# Patient Record
Sex: FEMALE | Race: ASIAN | NOT HISPANIC OR LATINO
[De-identification: names, ages, dates, MRNs, and addresses within clinical notes are randomized per-mention and may not be internally consistent; named-entity substitution may affect disease eponyms.]

---

## 2021-03-26 ENCOUNTER — APPOINTMENT (OUTPATIENT)
Dept: VASCULAR SURGERY | Facility: CLINIC | Age: 61
End: 2021-03-26

## 2021-05-21 ENCOUNTER — APPOINTMENT (OUTPATIENT)
Dept: UROLOGY | Facility: CLINIC | Age: 61
End: 2021-05-21

## 2021-05-27 ENCOUNTER — APPOINTMENT (OUTPATIENT)
Dept: UROLOGY | Facility: CLINIC | Age: 61
End: 2021-05-27

## 2022-02-11 ENCOUNTER — INPATIENT (INPATIENT)
Facility: HOSPITAL | Age: 62
LOS: 0 days | Discharge: HOME | End: 2022-02-12
Attending: INTERNAL MEDICINE | Admitting: INTERNAL MEDICINE
Payer: COMMERCIAL

## 2022-02-11 VITALS
OXYGEN SATURATION: 100 % | HEIGHT: 60 IN | WEIGHT: 141.98 LBS | RESPIRATION RATE: 17 BRPM | DIASTOLIC BLOOD PRESSURE: 74 MMHG | HEART RATE: 68 BPM | TEMPERATURE: 98 F | SYSTOLIC BLOOD PRESSURE: 118 MMHG

## 2022-02-11 DIAGNOSIS — E11.8 TYPE 2 DIABETES MELLITUS WITH UNSPECIFIED COMPLICATIONS: ICD-10-CM

## 2022-02-11 DIAGNOSIS — R07.89 OTHER CHEST PAIN: ICD-10-CM

## 2022-02-11 DIAGNOSIS — I10 ESSENTIAL (PRIMARY) HYPERTENSION: ICD-10-CM

## 2022-02-11 DIAGNOSIS — I73.9 PERIPHERAL VASCULAR DISEASE, UNSPECIFIED: ICD-10-CM

## 2022-02-11 DIAGNOSIS — E78.5 HYPERLIPIDEMIA, UNSPECIFIED: ICD-10-CM

## 2022-02-11 DIAGNOSIS — R06.02 SHORTNESS OF BREATH: ICD-10-CM

## 2022-02-11 DIAGNOSIS — M25.511 PAIN IN RIGHT SHOULDER: ICD-10-CM

## 2022-02-11 DIAGNOSIS — Z95.1 PRESENCE OF AORTOCORONARY BYPASS GRAFT: ICD-10-CM

## 2022-02-11 LAB
ALBUMIN SERPL ELPH-MCNC: 3.6 G/DL — SIGNIFICANT CHANGE UP (ref 3.3–5)
ALP SERPL-CCNC: 69 U/L — SIGNIFICANT CHANGE UP (ref 40–120)
ALT FLD-CCNC: 30 U/L — SIGNIFICANT CHANGE UP (ref 12–78)
ANION GAP SERPL CALC-SCNC: 7 MMOL/L — SIGNIFICANT CHANGE UP (ref 5–17)
APTT BLD: 26.9 SEC — LOW (ref 27.5–35.5)
AST SERPL-CCNC: 25 U/L — SIGNIFICANT CHANGE UP (ref 15–37)
BASOPHILS # BLD AUTO: 0.06 K/UL — SIGNIFICANT CHANGE UP (ref 0–0.2)
BASOPHILS NFR BLD AUTO: 0.8 % — SIGNIFICANT CHANGE UP (ref 0–2)
BILIRUB SERPL-MCNC: 0.4 MG/DL — SIGNIFICANT CHANGE UP (ref 0.2–1.2)
BUN SERPL-MCNC: 33 MG/DL — HIGH (ref 7–23)
CALCIUM SERPL-MCNC: 9.7 MG/DL — SIGNIFICANT CHANGE UP (ref 8.5–10.1)
CHLORIDE SERPL-SCNC: 104 MMOL/L — SIGNIFICANT CHANGE UP (ref 96–108)
CK MB BLD-MCNC: 0.4 % — SIGNIFICANT CHANGE UP (ref 0–3.5)
CK MB CFR SERPL CALC: 1.2 NG/ML — SIGNIFICANT CHANGE UP (ref 0.5–3.6)
CK SERPL-CCNC: 291 U/L — HIGH (ref 26–192)
CO2 SERPL-SCNC: 27 MMOL/L — SIGNIFICANT CHANGE UP (ref 22–31)
CREAT SERPL-MCNC: 1.52 MG/DL — HIGH (ref 0.5–1.3)
D DIMER BLD IA.RAPID-MCNC: <150 NG/ML DDU — SIGNIFICANT CHANGE UP
EOSINOPHIL # BLD AUTO: 0.21 K/UL — SIGNIFICANT CHANGE UP (ref 0–0.5)
EOSINOPHIL NFR BLD AUTO: 2.8 % — SIGNIFICANT CHANGE UP (ref 0–6)
FLUAV AG NPH QL: SIGNIFICANT CHANGE UP
FLUBV AG NPH QL: SIGNIFICANT CHANGE UP
GLUCOSE BLDC GLUCOMTR-MCNC: 126 MG/DL — HIGH (ref 70–99)
GLUCOSE BLDC GLUCOMTR-MCNC: 185 MG/DL — HIGH (ref 70–99)
GLUCOSE SERPL-MCNC: 234 MG/DL — HIGH (ref 70–99)
HCT VFR BLD CALC: 39 % — SIGNIFICANT CHANGE UP (ref 34.5–45)
HGB BLD-MCNC: 13.1 G/DL — SIGNIFICANT CHANGE UP (ref 11.5–15.5)
IMM GRANULOCYTES NFR BLD AUTO: 0.3 % — SIGNIFICANT CHANGE UP (ref 0–1.5)
INR BLD: 0.96 RATIO — SIGNIFICANT CHANGE UP (ref 0.88–1.16)
LYMPHOCYTES # BLD AUTO: 2.6 K/UL — SIGNIFICANT CHANGE UP (ref 1–3.3)
LYMPHOCYTES # BLD AUTO: 34.2 % — SIGNIFICANT CHANGE UP (ref 13–44)
MCHC RBC-ENTMCNC: 29.4 PG — SIGNIFICANT CHANGE UP (ref 27–34)
MCHC RBC-ENTMCNC: 33.6 G/DL — SIGNIFICANT CHANGE UP (ref 32–36)
MCV RBC AUTO: 87.6 FL — SIGNIFICANT CHANGE UP (ref 80–100)
MONOCYTES # BLD AUTO: 0.91 K/UL — HIGH (ref 0–0.9)
MONOCYTES NFR BLD AUTO: 12 % — SIGNIFICANT CHANGE UP (ref 2–14)
NEUTROPHILS # BLD AUTO: 3.81 K/UL — SIGNIFICANT CHANGE UP (ref 1.8–7.4)
NEUTROPHILS NFR BLD AUTO: 49.9 % — SIGNIFICANT CHANGE UP (ref 43–77)
NRBC # BLD: 0 /100 WBCS — SIGNIFICANT CHANGE UP (ref 0–0)
NT-PROBNP SERPL-SCNC: 60 PG/ML — SIGNIFICANT CHANGE UP (ref 0–125)
PLATELET # BLD AUTO: 287 K/UL — SIGNIFICANT CHANGE UP (ref 150–400)
POTASSIUM SERPL-MCNC: 4.4 MMOL/L — SIGNIFICANT CHANGE UP (ref 3.5–5.3)
POTASSIUM SERPL-SCNC: 4.4 MMOL/L — SIGNIFICANT CHANGE UP (ref 3.5–5.3)
PROT SERPL-MCNC: 8 GM/DL — SIGNIFICANT CHANGE UP (ref 6–8.3)
PROTHROM AB SERPL-ACNC: 11.2 SEC — SIGNIFICANT CHANGE UP (ref 10.6–13.6)
RBC # BLD: 4.45 M/UL — SIGNIFICANT CHANGE UP (ref 3.8–5.2)
RBC # FLD: 13.2 % — SIGNIFICANT CHANGE UP (ref 10.3–14.5)
SARS-COV-2 RNA SPEC QL NAA+PROBE: SIGNIFICANT CHANGE UP
SODIUM SERPL-SCNC: 138 MMOL/L — SIGNIFICANT CHANGE UP (ref 135–145)
TROPONIN I, HIGH SENSITIVITY RESULT: 11.7 NG/L — SIGNIFICANT CHANGE UP
TROPONIN I, HIGH SENSITIVITY RESULT: 12.1 NG/L — SIGNIFICANT CHANGE UP
WBC # BLD: 7.61 K/UL — SIGNIFICANT CHANGE UP (ref 3.8–10.5)
WBC # FLD AUTO: 7.61 K/UL — SIGNIFICANT CHANGE UP (ref 3.8–10.5)

## 2022-02-11 PROCEDURE — 73030 X-RAY EXAM OF SHOULDER: CPT | Mod: 26,RT

## 2022-02-11 PROCEDURE — 93010 ELECTROCARDIOGRAM REPORT: CPT

## 2022-02-11 PROCEDURE — 93306 TTE W/DOPPLER COMPLETE: CPT | Mod: 26

## 2022-02-11 PROCEDURE — 99223 1ST HOSP IP/OBS HIGH 75: CPT

## 2022-02-11 PROCEDURE — 71045 X-RAY EXAM CHEST 1 VIEW: CPT | Mod: 26

## 2022-02-11 PROCEDURE — 99285 EMERGENCY DEPT VISIT HI MDM: CPT

## 2022-02-11 RX ORDER — NITROGLYCERIN 6.5 MG
0.4 CAPSULE, EXTENDED RELEASE ORAL
Refills: 0 | Status: DISCONTINUED | OUTPATIENT
Start: 2022-02-11 | End: 2022-02-12

## 2022-02-11 RX ORDER — DEXTROSE 50 % IN WATER 50 %
12.5 SYRINGE (ML) INTRAVENOUS ONCE
Refills: 0 | Status: DISCONTINUED | OUTPATIENT
Start: 2022-02-11 | End: 2022-02-12

## 2022-02-11 RX ORDER — ACETAMINOPHEN 500 MG
650 TABLET ORAL EVERY 6 HOURS
Refills: 0 | Status: DISCONTINUED | OUTPATIENT
Start: 2022-02-11 | End: 2022-02-12

## 2022-02-11 RX ORDER — ATORVASTATIN CALCIUM 80 MG/1
40 TABLET, FILM COATED ORAL AT BEDTIME
Refills: 0 | Status: DISCONTINUED | OUTPATIENT
Start: 2022-02-11 | End: 2022-02-12

## 2022-02-11 RX ORDER — SODIUM CHLORIDE 9 MG/ML
1000 INJECTION, SOLUTION INTRAVENOUS
Refills: 0 | Status: DISCONTINUED | OUTPATIENT
Start: 2022-02-11 | End: 2022-02-12

## 2022-02-11 RX ORDER — HEPARIN SODIUM 5000 [USP'U]/ML
INJECTION INTRAVENOUS; SUBCUTANEOUS
Qty: 25000 | Refills: 0 | Status: DISCONTINUED | OUTPATIENT
Start: 2022-02-11 | End: 2022-02-11

## 2022-02-11 RX ORDER — INSULIN LISPRO 100/ML
VIAL (ML) SUBCUTANEOUS AT BEDTIME
Refills: 0 | Status: DISCONTINUED | OUTPATIENT
Start: 2022-02-11 | End: 2022-02-12

## 2022-02-11 RX ORDER — ASPIRIN/CALCIUM CARB/MAGNESIUM 324 MG
81 TABLET ORAL DAILY
Refills: 0 | Status: DISCONTINUED | OUTPATIENT
Start: 2022-02-12 | End: 2022-02-12

## 2022-02-11 RX ORDER — DEXTROSE 50 % IN WATER 50 %
25 SYRINGE (ML) INTRAVENOUS ONCE
Refills: 0 | Status: DISCONTINUED | OUTPATIENT
Start: 2022-02-11 | End: 2022-02-12

## 2022-02-11 RX ORDER — INSULIN LISPRO 100/ML
VIAL (ML) SUBCUTANEOUS
Refills: 0 | Status: DISCONTINUED | OUTPATIENT
Start: 2022-02-11 | End: 2022-02-12

## 2022-02-11 RX ORDER — HYDROCHLOROTHIAZIDE 25 MG
25 TABLET ORAL DAILY
Refills: 0 | Status: DISCONTINUED | OUTPATIENT
Start: 2022-02-11 | End: 2022-02-12

## 2022-02-11 RX ORDER — CLOPIDOGREL BISULFATE 75 MG/1
75 TABLET, FILM COATED ORAL DAILY
Refills: 0 | Status: DISCONTINUED | OUTPATIENT
Start: 2022-02-12 | End: 2022-02-12

## 2022-02-11 RX ORDER — LANOLIN ALCOHOL/MO/W.PET/CERES
3 CREAM (GRAM) TOPICAL AT BEDTIME
Refills: 0 | Status: DISCONTINUED | OUTPATIENT
Start: 2022-02-11 | End: 2022-02-12

## 2022-02-11 RX ORDER — DEXTROSE 50 % IN WATER 50 %
15 SYRINGE (ML) INTRAVENOUS ONCE
Refills: 0 | Status: DISCONTINUED | OUTPATIENT
Start: 2022-02-11 | End: 2022-02-12

## 2022-02-11 RX ORDER — HEPARIN SODIUM 5000 [USP'U]/ML
3800 INJECTION INTRAVENOUS; SUBCUTANEOUS ONCE
Refills: 0 | Status: DISCONTINUED | OUTPATIENT
Start: 2022-02-11 | End: 2022-02-11

## 2022-02-11 RX ORDER — SODIUM CHLORIDE 9 MG/ML
1000 INJECTION INTRAMUSCULAR; INTRAVENOUS; SUBCUTANEOUS ONCE
Refills: 0 | Status: COMPLETED | OUTPATIENT
Start: 2022-02-11 | End: 2022-02-11

## 2022-02-11 RX ORDER — METOPROLOL TARTRATE 50 MG
200 TABLET ORAL DAILY
Refills: 0 | Status: DISCONTINUED | OUTPATIENT
Start: 2022-02-11 | End: 2022-02-12

## 2022-02-11 RX ORDER — LOSARTAN POTASSIUM 100 MG/1
100 TABLET, FILM COATED ORAL DAILY
Refills: 0 | Status: DISCONTINUED | OUTPATIENT
Start: 2022-02-11 | End: 2022-02-12

## 2022-02-11 RX ORDER — GLUCAGON INJECTION, SOLUTION 0.5 MG/.1ML
1 INJECTION, SOLUTION SUBCUTANEOUS ONCE
Refills: 0 | Status: DISCONTINUED | OUTPATIENT
Start: 2022-02-11 | End: 2022-02-12

## 2022-02-11 RX ORDER — HEPARIN SODIUM 5000 [USP'U]/ML
3800 INJECTION INTRAVENOUS; SUBCUTANEOUS EVERY 6 HOURS
Refills: 0 | Status: DISCONTINUED | OUTPATIENT
Start: 2022-02-11 | End: 2022-02-11

## 2022-02-11 RX ADMIN — SODIUM CHLORIDE 1000 MILLILITER(S): 9 INJECTION INTRAMUSCULAR; INTRAVENOUS; SUBCUTANEOUS at 14:06

## 2022-02-11 RX ADMIN — Medication 3 MILLIGRAM(S): at 21:53

## 2022-02-11 RX ADMIN — ATORVASTATIN CALCIUM 40 MILLIGRAM(S): 80 TABLET, FILM COATED ORAL at 21:53

## 2022-02-11 NOTE — ED ADULT NURSE NOTE - SUICIDE SCREENING QUESTION 3
No
no pain R/no diplopia/no photophobia/no discharge R/no pain L/no lacrimation L/no lacrimation R/no discharge L/no irritation L/no irritation R

## 2022-02-11 NOTE — ED ADULT NURSE NOTE - CHIEF COMPLAINT QUOTE
pt a&o x4 ambulatory c.o of chest pain x2 days, seen at hospital last night discharged with pantoprazole. Pt states pain worsening. HX open heart surgery.

## 2022-02-11 NOTE — ED PROVIDER NOTE - CLINICAL SUMMARY MEDICAL DECISION MAKING FREE TEXT BOX
62yo female with pmh of DM, HTN, HLD, CABG, DVT presents complaining of "not feeling well" for a week or so. States she has been feeling intermittently lightheaded with a tight chest pain at the center of her chest associated with sob. Well appearing, vitals stable. Exam benign. Given high risk hx will get labs, CE, CXR, EKG. Consider admission for cardiac work up given HEART score of 6 before troponin.

## 2022-02-11 NOTE — ED ADULT NURSE NOTE - NSICDXPASTMEDICALHX_GEN_ALL_CORE_FT
PAST MEDICAL HISTORY:  CABG (coronary artery bypass graft)     CAD (coronary artery disease)     Diabetes mellitus type II     Dyslipidemia     HTN

## 2022-02-11 NOTE — H&P ADULT - HISTORY OF PRESENT ILLNESS
61 years old female with h/o HTN, HLD, DM, CAD s/p CABG in 2009, PCI with 3 stents in 2015, PAD s/p angioplasty in 04/2021 present to ED with complain SOB with exertion and intermittent chest discomfort. Patient reported SOB for last 2 months, more with exertion. No orthopnea. She also reported intermittent mid chest discomfort, 6-7/10 ( biting pain). Patient reported lightheadedness lastnight with near syncope, went to [facility] but left AMA. She also reported intermittent claudication as well. Reported right shoulder pain for last 3 months.  Hemodynamically stable, afebrile, sat well at . EKG with NSR, VR 66, QTc 406, TWI in I, II, V 4,5,6. No leukocytosis, plt 287, ddimer negative, Cr 1.52, K 4.4, hsTnT 12.1. CXR image reviewed, no focal consolidation.     SH: no toxic habits  FH: mother-HTN

## 2022-02-11 NOTE — H&P ADULT - ASSESSMENT
61 years old female with h/o HTN, HLD, DM, CAD s/p CABG in 2009, PCI with 3 stents in 2015, PAD s/p angioplasty in 04/2021 present to ED with complain SOB with exertion and intermittent chest discomfort  Hemodynamically stable, afebrile, sat well at [facility]. EKG with NSR, VR 66, QTc 406, TWI in I, II, V 4,5,6. No leukocytosis, plt 287, ddimer negative, Cr 1.52, K 4.4, hsTnT 12.1. CXR image reviewed, no focal consolidation.     Admitted with chest pain, SOB

## 2022-02-11 NOTE — H&P ADULT - NSHPREVIEWOFSYSTEMS_GEN_ALL_CORE
All ROS were negative except SOB on exertion, intermittent chest pain, intermittent claudication, right shoulder pain

## 2022-02-11 NOTE — H&P ADULT - PROBLEM SELECTOR PLAN 1
present with intermittent chest discomfort, no clear relation to physical activity  h/o CAD s/p CABG in 2009, PCI with 3 stents in 2015  EKG with NSR, VR 66, QTc 406, TWI in I, II, V 4,5,6  hsTnT 12.1  High HEART score  Serial troponin/CK/CKMB  on aspirin, plavix, statin, BB  ECHO, telemetry monitoring  ED consulted cardiology  nitroglycerin prn

## 2022-02-11 NOTE — ED PROVIDER NOTE - ATTENDING CONTRIBUTION TO CARE
I performed a history and physical exam of the patient and discussed their management with the resident and /or advanced care provider. I reviewed the resident and /or ACP's note and agree with the documented findings and plan of care. My medical decision making and observations are found above.    60 yo F hx HTN, HLD, DM2, CAD s/p CABG presenting with intermittent exertional chest pain and sob x 2 days, EKG with inversion in lateral leads, AMAd from [facility] ED after rec admission, HEART score 6, tba.

## 2022-02-11 NOTE — ED PROVIDER NOTE - OBJECTIVE STATEMENT
60yo female with pmh of DM, HTN, HLD, CABG, DVT presents complaining of "not feeling well" for a week or so. States she has been feeling intermittently lightheaded with a tight chest pain at the center of her chest associated with sob. States she can get it anytime but does get worse with walking up stairs. States she had a syncopal episode yesterday for which she went to [facility]. Left ama because she didn't want to stay over night. Denies headache, cough, fevers/chills, LE swelling, palpitations and other associated sx. Reports that her BP and blood sugar have been fluctuating and higher than normal. 62yo female with pmh of DM, HTN, HLD, CABG, DVT presents complaining of "not feeling well" for a week or so. States she has been feeling intermittently lightheaded with a tight chest pain at the center of her chest associated with sob. States she can get it anytime but does get worse with walking up stairs. States she had a syncopal episode yesterday for which she went to [facility]. Left ama because she didn't want to stay over night. Denies headache, cough, fevers/chills, LE swelling, palpitations and other associated sx. Reports that her BP and blood sugar have been fluctuating and higher than normal.

## 2022-02-11 NOTE — H&P ADULT - PROBLEM SELECTOR PLAN 3
PAD s/p angioplasty in 04/2021, reported intermittent claudication  on aspirin, plavix, statin  RUPALI

## 2022-02-11 NOTE — H&P ADULT - PROBLEM SELECTOR PLAN 8
right shoulder pain for 3 months  ROM intact, no focal tenderness  No fall or trauma  Xray shoulder  tylenol prn

## 2022-02-11 NOTE — H&P ADULT - NSHPPHYSICALEXAM_GEN_ALL_CORE
CONSTITUTIONAL: Well developed, well nourished, alert and cooperative, no acute distress.  EYES: PERRL, no scleral icterus  ENT: Mucosa moist, tongue normal.   NECK: Neck supple, trachea midline, non-tender  CARDIAC: Normal S1 and S2. Regular rate and rhythms. No murmurs. No Pedal edema. Peripheral pulses intact  LUNGS: Clear to auscultation, equal air entry both lungs. No rales, rhonchi, wheezing. Normal respiratory effort.   ABDOMEN: Soft, nondistended, nontender. No guarding or rebound tenderness. No hepatomegaly or splenomegaly. Bowel sound normal.   MUSCULOSKELETAL: Normocephalic, atraumatic. No clubbing or cyanosis. No significant deformity or joint abnormality  NEUROLOGICAL: No gross motor or sensory deficits.  SKIN: no lesions or eruptions. Normal turgor  PSYCHIATRIC: A&O x 3, appropriate mood and affect

## 2022-02-11 NOTE — CONSULT NOTE ADULT - SUBJECTIVE AND OBJECTIVE BOX
CHIEF COMPLAINT:  Patient is a 61y old  Female who presents with a chief complaint of chest pain, SOB on exertion (11 Feb 2022 15:09)      HPI:  She is a pleasant 61-year-old with dyslipidemia, hypertension, type 2 diabetes, known CAD and previous CABG in 2009, prior PCI's in 2015, PAD, history of PTCA in 4/21, admitted with shortness of breath and chest discomfort intermittently for the last several months.  She has midsternal chest discomfort but also reproducible chest wall symptom.  Has had right shoulder pain for the last several months as well.      ALLERGIES:  No Known Allergies    Home Medications:  Lopressor:  (11 Feb 2022 12:35)  losartan:  (11 Feb 2022 12:35)  metFORMIN:  (11 Feb 2022 12:35)  Plavix 300 mg oral tablet:  (11 Feb 2022 12:35)  simvastatin:  (11 Feb 2022 12:35)    PAST MEDICAL & SURGICAL HISTORY:  CAD (coronary artery disease)    CABG (coronary artery bypass graft)    Diabetes mellitus type II    Dyslipidemia    HTN    S/P CABG x 3          FAMILY HISTORY:  FH: HTN (hypertension) (Mother)        SOCIAL HISTORY:  denied tobacco    REVIEW OF SYSTEMS:  General:  No wt loss, fevers, chills, night sweats  Eyes:  Good vision, no reported pain  ENT:  No sore throat, pain, runny nose, dysphagia  CV:  No pain, palpitations, hypo/hypertension  Resp:  No dyspnea, cough, tachypnea, wheezing  GI:  No pain, nausea, vomiting, diarrhea, constipation  :  No pain, bleeding, incontinence, nocturia  Muscle:  No pain, weakness  Breast:  No pain, abscess, mass, discharge  Neuro:  No weakness, tingling, memory problems  Psych:  No fatigue, insomnia, mood problems, depression  Endocrine:  No polyuria, polydipsia, cold/heat intolerance  Heme:  No petechiae, ecchymosis, easy bruisability  Skin:  No rash, edema    PHYSICAL EXAM:  Vital Signs:  Vital Signs Last 24 Hrs  T(C): 36.9 (11 Feb 2022 18:01), Max: 36.9 (11 Feb 2022 18:01)  T(F): 98.5 (11 Feb 2022 18:01), Max: 98.5 (11 Feb 2022 18:01)  HR: 63 (11 Feb 2022 18:01) (60 - 68)  BP: 147/69 (11 Feb 2022 18:01) (113/63 - 147/69)  RR: 18 (11 Feb 2022 18:01) (17 - 20)  SpO2: 96% (11 Feb 2022 18:01) (96% - 100%)    Tele: SR    Constitutional: well developed, normal appearance, well groomed, well nourished, no deformities and no acute distress.   Eyes: the conjunctiva exhibited no abnormalities and the eyelids demonstrated no xanthelasmas.   HEENT: normal oral mucosa, no oral pallor and no oral cyanosis.   Neck: normal jugular venous A waves present, normal jugular venous V waves present and no jugular venous epstein A waves.   Pulmonary: no respiratory distress, normal respiratory rhythm and effort, no accessory muscle use and lungs were clear to auscultation bilaterally.   Cardiovascular: heart rate and rhythm were normal, normal S1 and S2 and no murmur, gallop, rub, heave or thrill are present.   Abdomen: soft, non-tender   Musculoskeletal: the gait could not be assessed.   Extremities: no clubbing of the fingernails, no localized cyanosis, no petechial hemorrhages and no ischemic changes.   Skin: normal skin color and pigmentation, no rash, no venous stasis, no skin lesions, no skin ulcer and no xanthoma was observed.   Psychiatric: oriented to person, place, and time, the affect was normal, the mood was normal and not feeling anxious.      LABORATORY:                          13.1   7.61  )-----------( 287      ( 11 Feb 2022 12:47 )             39.0     02-11    138  |  104  |  33<H>  ----------------------------<  234<H>  4.4   |  27  |  1.52<H>    Ca    9.7      11 Feb 2022 12:47    TPro  8.0  /  Alb  3.6  /  TBili  0.4  /  DBili  x   /  AST  25  /  ALT  30  /  AlkPhos  69  02-11      CARDIAC MARKERS ( 11 Feb 2022 16:32 )  x     / x     / 291 U/L / x     / 1.2 ng/mL      CAPILLARY BLOOD GLUCOSE  POCT Blood Glucose.: 126 mg/dL (11 Feb 2022 18:16)    LIVER FUNCTIONS - ( 11 Feb 2022 12:47 )  Alb: 3.6 g/dL / Pro: 8.0 gm/dL / ALK PHOS: 69 U/L / ALT: 30 U/L / AST: 25 U/L / GGT: x           PT/INR - ( 11 Feb 2022 12:47 )   PT: 11.2 sec;   INR: 0.96 ratio         PTT - ( 11 Feb 2022 12:47 )  PTT:26.9 sec    BNPSerum Pro-Brain Natriuretic Peptide: 60 pg/mL (02-11-22 @ 16:32)      IMAGING:  < from: 12 Lead ECG (02.11.22 @ 11:21) >  Normal sinus rhythm  Minimal voltage criteria for LVH, may be normal variant  Nonspecific ST and T wave abnormality  Abnormal ECG  No previous ECGs available    < end of copied text >    < from: Xray Chest 1 View- PORTABLE-Urgent (02.11.22 @ 14:30) >  IMPRESSION: No acute finding or change.    < end of copied text >    ASSESSMENT:  She is a pleasant 61-year-old with dyslipidemia, hypertension, type 2 diabetes, known CAD and previous CABG in 2009, prior PCI's in 2015, PAD, history of PTCA in 4/21, admitted with shortness of breath and chest discomfort intermittently for the last several months.  She has midsternal chest discomfort but also reproducible chest wall symptom.  Has had right shoulder pain for the last several months as well.    PLAN:     Continue aspirin and statin for cardiac risk reduction and lipid-lowering.  Continue antihypertensives losartan, metoprolol and hydrochlorothiazide.  Stay on insulin for glycemic reduction in the setting of type 2 diabetes.  Follow troponin trends.  No need for IV heparin as there is no clear evidence of ACS at present.  Echo ordered to assess LV function and valvular status.  It sounds she has had a prior stress test perhaps about 6 months earlier and her outside cardiology office.  Further decisions to be determined based on her clinical status, telemetry monitoring and troponin trending.       Ammon Gauthier MD, FACC, TERESO, ANNIE, FACP  Director, Heart Failure Services  [facility]  , Department of Cardiology  [facility]     CHIEF COMPLAINT:  Patient is a 61y old  Female who presents with a chief complaint of chest pain, SOB on exertion (11 Feb 2022 15:09)      HPI:  She is a pleasant 61-year-old with dyslipidemia, hypertension, type 2 diabetes, known CAD and previous CABG in 2009, prior PCI's in 2015, PAD, history of PTCA in 4/21, admitted with shortness of breath and chest discomfort intermittently for the last several months.  She has midsternal chest discomfort but also reproducible chest wall symptom.  Has had right shoulder pain for the last several months as well.      ALLERGIES:  No Known Allergies    Home Medications:  Lopressor:  (11 Feb 2022 12:35)  losartan:  (11 Feb 2022 12:35)  metFORMIN:  (11 Feb 2022 12:35)  Plavix 300 mg oral tablet:  (11 Feb 2022 12:35)  simvastatin:  (11 Feb 2022 12:35)    PAST MEDICAL & SURGICAL HISTORY:  CAD (coronary artery disease)    CABG (coronary artery bypass graft)    Diabetes mellitus type II    Dyslipidemia    HTN    S/P CABG x 3          FAMILY HISTORY:  FH: HTN (hypertension) (Mother)        SOCIAL HISTORY:  denied tobacco    REVIEW OF SYSTEMS:  General:  No wt loss, fevers, chills, night sweats  Eyes:  Good vision, no reported pain  ENT:  No sore throat, pain, runny nose, dysphagia  CV:  No pain, palpitations, hypo/hypertension  Resp:  No dyspnea, cough, tachypnea, wheezing  GI:  No pain, nausea, vomiting, diarrhea, constipation  :  No pain, bleeding, incontinence, nocturia  Muscle:  No pain, weakness  Breast:  No pain, abscess, mass, discharge  Neuro:  No weakness, tingling, memory problems  Psych:  No fatigue, insomnia, mood problems, depression  Endocrine:  No polyuria, polydipsia, cold/heat intolerance  Heme:  No petechiae, ecchymosis, easy bruisability  Skin:  No rash, edema    PHYSICAL EXAM:  Vital Signs:  Vital Signs Last 24 Hrs  T(C): 36.9 (11 Feb 2022 18:01), Max: 36.9 (11 Feb 2022 18:01)  T(F): 98.5 (11 Feb 2022 18:01), Max: 98.5 (11 Feb 2022 18:01)  HR: 63 (11 Feb 2022 18:01) (60 - 68)  BP: 147/69 (11 Feb 2022 18:01) (113/63 - 147/69)  RR: 18 (11 Feb 2022 18:01) (17 - 20)  SpO2: 96% (11 Feb 2022 18:01) (96% - 100%)    Tele: SR    Constitutional: well developed, normal appearance, well groomed, well nourished, no deformities and no acute distress.   Eyes: the conjunctiva exhibited no abnormalities and the eyelids demonstrated no xanthelasmas.   HEENT: normal oral mucosa, no oral pallor and no oral cyanosis.   Neck: normal jugular venous A waves present, normal jugular venous V waves present and no jugular venous epstein A waves.   Pulmonary: no respiratory distress, normal respiratory rhythm and effort, no accessory muscle use and lungs were clear to auscultation bilaterally.   Cardiovascular: heart rate and rhythm were normal, normal S1 and S2 and no murmur, gallop, rub, heave or thrill are present.   Abdomen: soft, non-tender   Musculoskeletal: the gait could not be assessed.   Extremities: no clubbing of the fingernails, no localized cyanosis, no petechial hemorrhages and no ischemic changes.   Skin: normal skin color and pigmentation, no rash, no venous stasis, no skin lesions, no skin ulcer and no xanthoma was observed.   Psychiatric: oriented to person, place, and time, the affect was normal, the mood was normal and not feeling anxious.      LABORATORY:                          13.1   7.61  )-----------( 287      ( 11 Feb 2022 12:47 )             39.0     02-11    138  |  104  |  33<H>  ----------------------------<  234<H>  4.4   |  27  |  1.52<H>    Ca    9.7      11 Feb 2022 12:47    TPro  8.0  /  Alb  3.6  /  TBili  0.4  /  DBili  x   /  AST  25  /  ALT  30  /  AlkPhos  69  02-11      CARDIAC MARKERS ( 11 Feb 2022 16:32 )  x     / x     / 291 U/L / x     / 1.2 ng/mL      CAPILLARY BLOOD GLUCOSE  POCT Blood Glucose.: 126 mg/dL (11 Feb 2022 18:16)    LIVER FUNCTIONS - ( 11 Feb 2022 12:47 )  Alb: 3.6 g/dL / Pro: 8.0 gm/dL / ALK PHOS: 69 U/L / ALT: 30 U/L / AST: 25 U/L / GGT: x           PT/INR - ( 11 Feb 2022 12:47 )   PT: 11.2 sec;   INR: 0.96 ratio         PTT - ( 11 Feb 2022 12:47 )  PTT:26.9 sec    BNPSerum Pro-Brain Natriuretic Peptide: 60 pg/mL (02-11-22 @ 16:32)      IMAGING:  < from: 12 Lead ECG (02.11.22 @ 11:21) >  Normal sinus rhythm  Minimal voltage criteria for LVH, may be normal variant  Nonspecific ST and T wave abnormality  Abnormal ECG  No previous ECGs available    < end of copied text >    < from: Xray Chest 1 View- PORTABLE-Urgent (02.11.22 @ 14:30) >  IMPRESSION: No acute finding or change.    < end of copied text >    < from: TTE Echo Complete w/o Contrast w/ Doppler (02.11.22 @ 16:54) >  Summary:   1. Left ventricular ejection fraction, by visual estimation, is 60 to   65%.   2. Technically fair study.   3. Normal global left ventricular systolic function.   4. Mildly increased LV wall thickness.   5. Normal left ventricular internal cavity size.   6. Spectral Doppler showsimpaired relaxation pattern of left   ventricular myocardial filling (Grade I diastolic dysfunction).   7. Normal right ventricular size and function.   8. Normal left atrial size.   9. Normal right atrial size.  10. There is no evidence of pericardial effusion.  11. Mild tricuspid regurgitation.  12. Sclerotic aortic valve with normal opening.  13. Moderate pulmonic valve regurgitation.  14. Increased relative wall thickness with normal mass index consistent   with left ventricular concentric remodeling.    < end of copied text >    ASSESSMENT:  She is a pleasant 61-year-old with dyslipidemia, hypertension, type 2 diabetes, known CAD and previous CABG in 2009, prior PCI's in 2015, PAD, history of PTCA in 4/21, admitted with shortness of breath and chest discomfort intermittently for the last several months.  She has midsternal chest discomfort but also reproducible chest wall symptom.  Has had right shoulder pain for the last several months as well.    PLAN:     Continue aspirin and statin for cardiac risk reduction and lipid-lowering.  Continue antihypertensives losartan, metoprolol and hydrochlorothiazide.  Stay on insulin for glycemic reduction in the setting of type 2 diabetes.  Follow troponin trends.  No need for IV heparin as there is no clear evidence of ACS at present.  Echo ordered to assess LV function and valvular status.  It sounds she has had a prior stress test perhaps about 6 months earlier and her outside cardiology office.  Further decisions to be determined based on her clinical status, telemetry monitoring and troponin trending. Prelim echo within acceptable limits.       Ammon Gauthier MD, FACC, TERESO, ANNIE, FACP  Director, Heart Failure Services  [facility]  , Department of Cardiology  [facility]     CHIEF COMPLAINT:  Patient is a 61y old  Female who presents with a chief complaint of chest pain, SOB on exertion (11 Feb 2022 15:09)      HPI:  She is a pleasant 61-year-old with dyslipidemia, hypertension, type 2 diabetes, known CAD and previous CABG in 2009, prior PCI's in 2015, PAD, history of PTCA in 4/21, admitted with shortness of breath and chest discomfort intermittently for the last several months.  She has midsternal chest discomfort but also reproducible chest wall symptom.  Has had right shoulder pain for the last several months as well.      ALLERGIES:  No Known Allergies    Home Medications:  Lopressor:  (11 Feb 2022 12:35)  losartan:  (11 Feb 2022 12:35)  metFORMIN:  (11 Feb 2022 12:35)  Plavix 300 mg oral tablet:  (11 Feb 2022 12:35)  simvastatin:  (11 Feb 2022 12:35)    PAST MEDICAL & SURGICAL HISTORY:  CAD (coronary artery disease)    CABG (coronary artery bypass graft)    Diabetes mellitus type II    Dyslipidemia    HTN    S/P CABG x 3          FAMILY HISTORY:  FH: HTN (hypertension) (Mother)        SOCIAL HISTORY:  denied tobacco    REVIEW OF SYSTEMS:  General:  No wt loss, fevers, chills, night sweats  Eyes:  Good vision, no reported pain  ENT:  No sore throat, pain, runny nose, dysphagia  CV:  No pain, palpitations, hypo/hypertension  Resp:  No dyspnea, cough, tachypnea, wheezing  GI:  No pain, nausea, vomiting, diarrhea, constipation  :  No pain, bleeding, incontinence, nocturia  Muscle:  No pain, weakness  Breast:  No pain, abscess, mass, discharge  Neuro:  No weakness, tingling, memory problems  Psych:  No fatigue, insomnia, mood problems, depression  Endocrine:  No polyuria, polydipsia, cold/heat intolerance  Heme:  No petechiae, ecchymosis, easy bruisability  Skin:  No rash, edema    PHYSICAL EXAM:  Vital Signs:  Vital Signs Last 24 Hrs  T(C): 36.9 (11 Feb 2022 18:01), Max: 36.9 (11 Feb 2022 18:01)  T(F): 98.5 (11 Feb 2022 18:01), Max: 98.5 (11 Feb 2022 18:01)  HR: 63 (11 Feb 2022 18:01) (60 - 68)  BP: 147/69 (11 Feb 2022 18:01) (113/63 - 147/69)  RR: 18 (11 Feb 2022 18:01) (17 - 20)  SpO2: 96% (11 Feb 2022 18:01) (96% - 100%)    Tele: SR    Constitutional: well developed, normal appearance, well groomed, well nourished, no deformities and no acute distress.   Eyes: the conjunctiva exhibited no abnormalities and the eyelids demonstrated no xanthelasmas.   HEENT: normal oral mucosa, no oral pallor and no oral cyanosis.   Neck: normal jugular venous A waves present, normal jugular venous V waves present and no jugular venous epstein A waves.   Pulmonary: no respiratory distress, normal respiratory rhythm and effort, no accessory muscle use and lungs were clear to auscultation bilaterally.   Cardiovascular: heart rate and rhythm were normal, normal S1 and S2 and no murmur, gallop, rub, heave or thrill are present.   Abdomen: soft, non-tender   Musculoskeletal: the gait could not be assessed.   Extremities: no clubbing of the fingernails, no localized cyanosis, no petechial hemorrhages and no ischemic changes.   Skin: normal skin color and pigmentation, no rash, no venous stasis, no skin lesions, no skin ulcer and no xanthoma was observed.   Psychiatric: oriented to person, place, and time, the affect was normal, the mood was normal and not feeling anxious.      LABORATORY:                          13.1   7.61  )-----------( 287      ( 11 Feb 2022 12:47 )             39.0     02-11    138  |  104  |  33<H>  ----------------------------<  234<H>  4.4   |  27  |  1.52<H>    Ca    9.7      11 Feb 2022 12:47    TPro  8.0  /  Alb  3.6  /  TBili  0.4  /  DBili  x   /  AST  25  /  ALT  30  /  AlkPhos  69  02-11      CARDIAC MARKERS ( 11 Feb 2022 16:32 )  x     / x     / 291 U/L / x     / 1.2 ng/mL      CAPILLARY BLOOD GLUCOSE  POCT Blood Glucose.: 126 mg/dL (11 Feb 2022 18:16)    LIVER FUNCTIONS - ( 11 Feb 2022 12:47 )  Alb: 3.6 g/dL / Pro: 8.0 gm/dL / ALK PHOS: 69 U/L / ALT: 30 U/L / AST: 25 U/L / GGT: x           PT/INR - ( 11 Feb 2022 12:47 )   PT: 11.2 sec;   INR: 0.96 ratio         PTT - ( 11 Feb 2022 12:47 )  PTT:26.9 sec    BNPSerum Pro-Brain Natriuretic Peptide: 60 pg/mL (02-11-22 @ 16:32)      IMAGING:  < from: 12 Lead ECG (02.11.22 @ 11:21) >  Normal sinus rhythm  Minimal voltage criteria for LVH, may be normal variant  Nonspecific ST and T wave abnormality  Abnormal ECG  No previous ECGs available    < end of copied text >    < from: Xray Chest 1 View- PORTABLE-Urgent (02.11.22 @ 14:30) >  IMPRESSION: No acute finding or change.    < end of copied text >    < from: TTE Echo Complete w/o Contrast w/ Doppler (02.11.22 @ 16:54) >  Summary:   1. Left ventricular ejection fraction, by visual estimation, is 60 to   65%.   2. Technically fair study.   3. Normal global left ventricular systolic function.   4. Mildly increased LV wall thickness.   5. Normal left ventricular internal cavity size.   6. Spectral Doppler showsimpaired relaxation pattern of left   ventricular myocardial filling (Grade I diastolic dysfunction).   7. Normal right ventricular size and function.   8. Normal left atrial size.   9. Normal right atrial size.  10. There is no evidence of pericardial effusion.  11. Mild tricuspid regurgitation.  12. Sclerotic aortic valve with normal opening.  13. Moderate pulmonic valve regurgitation.  14. Increased relative wall thickness with normal mass index consistent   with left ventricular concentric remodeling.    < end of copied text >    ASSESSMENT:  She is a pleasant 61-year-old with dyslipidemia, hypertension, type 2 diabetes, known CAD and previous CABG in 2009, prior PCI's in 2015, PAD, history of PTCA in 4/21, admitted with shortness of breath and chest discomfort intermittently for the last several months.  She has midsternal chest discomfort but also reproducible chest wall symptom.  Has had right shoulder pain for the last several months as well.    PLAN:     Continue aspirin & plavix plus statin for cardiac risk reduction and lipid-lowering.  Continue antihypertensives losartan, metoprolol and hydrochlorothiazide.  Stay on insulin for glycemic reduction in the setting of type 2 diabetes.  Follow troponin trends.  No need for IV heparin as there is no clear evidence of ACS at present.  Echo ordered to assess LV function and valvular status.  It sounds she has had a prior stress test perhaps about 6 months earlier and her outside cardiology office.  Further decisions to be determined based on her clinical status, telemetry monitoring and troponin trending. Prelim echo within acceptable limits.       Ammon Gauthier MD, FACC, FASVAHE, FASNC, FACP  Director, Heart Failure Services  [facility]  , Department of Cardiology  [facility]

## 2022-02-11 NOTE — ED PROVIDER NOTE - NS ED ROS FT
Constitutional: (-) Fever, (-) Anorexia, (-) Generalized Malaise  Eyes: (-)Discharge, (-) Irritation,  (-) Visual changes  EARS: (-) Ear Pain, (-) Apparent hearing changes  NOSE: (-) Congestion, (-) Bloody nose  MOUTH/THROAT: (-) Vocal Changes, (-) Drooling, (-) Sore throat  NECK: (-) Lumps, (-) Stiffness, (-) Pain  CV: (+) Chest Pain, (-) Palpitations, (-) Edema   RESP:  (-) Cough, (+) SOB, (-) MATUTE,  (-) Wheezing  GI: (-) Nausea, (-) Vomiting, (-) Abdominal Pain, (-) Diarrhea, (-) Constipation, (-) Bloody stools  : (-) Dysuria, (-) Frequency, (-) Hematuria, (-) Incontinence  MSK: (-) Joint Pain, (-) Back Pain, (-) Deformities  SKIN: (-) Wounds, (-) Color change, (-)Rash, (-) Swelling  NEURO:(-) Headache, (-) Dizziness, (-) Numbness/Tingling,  (-)LOC

## 2022-02-11 NOTE — ED ADULT NURSE NOTE - CHPI ED NUR SEVERITY2
4 mm right lower lobe nodule containing punctate calcification most likely granuloma.  Outpatient followup recommended.    PAIN SCALE 6 OF 10.

## 2022-02-11 NOTE — ED ADULT NURSE NOTE - OBJECTIVE STATEMENT
61year old  female pmh DM, HTN, HLD, CABG, DVT presents complaining of chest pain. Patient states feeling short of breath and it gets worse with activity. Patient had a syncopal episode last night. Patient was at [facility] yesterday but signed out AMA. Patient denies headache, cough, or fevers/chills

## 2022-02-12 ENCOUNTER — TRANSCRIPTION ENCOUNTER (OUTPATIENT)
Age: 62
End: 2022-02-12

## 2022-02-12 VITALS
OXYGEN SATURATION: 96 % | SYSTOLIC BLOOD PRESSURE: 151 MMHG | RESPIRATION RATE: 18 BRPM | DIASTOLIC BLOOD PRESSURE: 84 MMHG | HEART RATE: 68 BPM | TEMPERATURE: 98 F

## 2022-02-12 LAB
A1C WITH ESTIMATED AVERAGE GLUCOSE RESULT: 7.4 % — HIGH (ref 4–5.6)
ALBUMIN SERPL ELPH-MCNC: 3 G/DL — LOW (ref 3.3–5)
ALP SERPL-CCNC: 59 U/L — SIGNIFICANT CHANGE UP (ref 40–120)
ALT FLD-CCNC: 23 U/L — SIGNIFICANT CHANGE UP (ref 12–78)
ANION GAP SERPL CALC-SCNC: 7 MMOL/L — SIGNIFICANT CHANGE UP (ref 5–17)
AST SERPL-CCNC: 20 U/L — SIGNIFICANT CHANGE UP (ref 15–37)
BILIRUB SERPL-MCNC: 0.4 MG/DL — SIGNIFICANT CHANGE UP (ref 0.2–1.2)
BUN SERPL-MCNC: 29 MG/DL — HIGH (ref 7–23)
CALCIUM SERPL-MCNC: 9.2 MG/DL — SIGNIFICANT CHANGE UP (ref 8.5–10.1)
CHLORIDE SERPL-SCNC: 107 MMOL/L — SIGNIFICANT CHANGE UP (ref 96–108)
CHOLEST SERPL-MCNC: 171 MG/DL — SIGNIFICANT CHANGE UP
CK MB BLD-MCNC: <0.5 % — SIGNIFICANT CHANGE UP (ref 0–3.5)
CK MB CFR SERPL CALC: <1 NG/ML — SIGNIFICANT CHANGE UP (ref 0.5–3.6)
CK SERPL-CCNC: 216 U/L — HIGH (ref 26–192)
CO2 SERPL-SCNC: 25 MMOL/L — SIGNIFICANT CHANGE UP (ref 22–31)
CREAT SERPL-MCNC: 1.23 MG/DL — SIGNIFICANT CHANGE UP (ref 0.5–1.3)
ESTIMATED AVERAGE GLUCOSE: 166 MG/DL — HIGH (ref 68–114)
GLUCOSE BLDC GLUCOMTR-MCNC: 190 MG/DL — HIGH (ref 70–99)
GLUCOSE BLDC GLUCOMTR-MCNC: 245 MG/DL — HIGH (ref 70–99)
GLUCOSE SERPL-MCNC: 162 MG/DL — HIGH (ref 70–99)
HCT VFR BLD CALC: 35.7 % — SIGNIFICANT CHANGE UP (ref 34.5–45)
HCV AB S/CO SERPL IA: 0.18 S/CO — SIGNIFICANT CHANGE UP (ref 0–0.99)
HCV AB SERPL-IMP: SIGNIFICANT CHANGE UP
HDLC SERPL-MCNC: 35 MG/DL — LOW
HGB BLD-MCNC: 11.8 G/DL — SIGNIFICANT CHANGE UP (ref 11.5–15.5)
LIPID PNL WITH DIRECT LDL SERPL: 87 MG/DL — SIGNIFICANT CHANGE UP
MCHC RBC-ENTMCNC: 29.4 PG — SIGNIFICANT CHANGE UP (ref 27–34)
MCHC RBC-ENTMCNC: 33.1 G/DL — SIGNIFICANT CHANGE UP (ref 32–36)
MCV RBC AUTO: 88.8 FL — SIGNIFICANT CHANGE UP (ref 80–100)
NON HDL CHOLESTEROL: 136 MG/DL — HIGH
NRBC # BLD: 0 /100 WBCS — SIGNIFICANT CHANGE UP (ref 0–0)
NT-PROBNP SERPL-SCNC: 44 PG/ML — SIGNIFICANT CHANGE UP (ref 0–125)
PLATELET # BLD AUTO: 250 K/UL — SIGNIFICANT CHANGE UP (ref 150–400)
POTASSIUM SERPL-MCNC: 3.8 MMOL/L — SIGNIFICANT CHANGE UP (ref 3.5–5.3)
POTASSIUM SERPL-SCNC: 3.8 MMOL/L — SIGNIFICANT CHANGE UP (ref 3.5–5.3)
PROT SERPL-MCNC: 6.9 GM/DL — SIGNIFICANT CHANGE UP (ref 6–8.3)
RBC # BLD: 4.02 M/UL — SIGNIFICANT CHANGE UP (ref 3.8–5.2)
RBC # FLD: 13.3 % — SIGNIFICANT CHANGE UP (ref 10.3–14.5)
SODIUM SERPL-SCNC: 139 MMOL/L — SIGNIFICANT CHANGE UP (ref 135–145)
TRIGL SERPL-MCNC: 244 MG/DL — HIGH
TROPONIN I, HIGH SENSITIVITY RESULT: 12 NG/L — SIGNIFICANT CHANGE UP
WBC # BLD: 6.88 K/UL — SIGNIFICANT CHANGE UP (ref 3.8–10.5)
WBC # FLD AUTO: 6.88 K/UL — SIGNIFICANT CHANGE UP (ref 3.8–10.5)

## 2022-02-12 PROCEDURE — 99233 SBSQ HOSP IP/OBS HIGH 50: CPT

## 2022-02-12 PROCEDURE — 99283 EMERGENCY DEPT VISIT LOW MDM: CPT

## 2022-02-12 PROCEDURE — 93923 UPR/LXTR ART STDY 3+ LVLS: CPT | Mod: 26

## 2022-02-12 RX ORDER — AMLODIPINE BESYLATE 2.5 MG/1
1 TABLET ORAL
Qty: 30 | Refills: 0
Start: 2022-02-12 | End: 2022-03-13

## 2022-02-12 RX ORDER — LOSARTAN POTASSIUM 100 MG/1
100 TABLET, FILM COATED ORAL
Qty: 30 | Refills: 0
Start: 2022-02-12

## 2022-02-12 RX ORDER — CLOPIDOGREL BISULFATE 75 MG/1
1 TABLET, FILM COATED ORAL
Qty: 30 | Refills: 0
Start: 2022-02-12 | End: 2022-03-13

## 2022-02-12 RX ORDER — METFORMIN HYDROCHLORIDE 850 MG/1
1 TABLET ORAL
Qty: 60 | Refills: 0
Start: 2022-02-12 | End: 2022-03-13

## 2022-02-12 RX ORDER — DAPAGLIFLOZIN 10 MG/1
1 TABLET, FILM COATED ORAL
Qty: 30 | Refills: 0
Start: 2022-02-12 | End: 2022-03-13

## 2022-02-12 RX ORDER — SIMVASTATIN 20 MG/1
1 TABLET, FILM COATED ORAL
Qty: 30 | Refills: 0
Start: 2022-02-12 | End: 2022-03-13

## 2022-02-12 RX ORDER — LOSARTAN POTASSIUM 100 MG/1
1 TABLET, FILM COATED ORAL
Qty: 30 | Refills: 0
Start: 2022-02-12 | End: 2022-03-13

## 2022-02-12 RX ORDER — METOPROLOL TARTRATE 50 MG
1 TABLET ORAL
Qty: 0 | Refills: 0 | DISCHARGE
Start: 2022-02-12

## 2022-02-12 RX ORDER — SIMVASTATIN 20 MG/1
0 TABLET, FILM COATED ORAL
Qty: 0 | Refills: 0 | DISCHARGE

## 2022-02-12 RX ORDER — GLIMEPIRIDE 1 MG
1 TABLET ORAL
Qty: 30 | Refills: 0
Start: 2022-02-12 | End: 2022-03-13

## 2022-02-12 RX ORDER — LOSARTAN POTASSIUM 100 MG/1
0 TABLET, FILM COATED ORAL
Qty: 0 | Refills: 0 | DISCHARGE

## 2022-02-12 RX ORDER — METOPROLOL TARTRATE 50 MG
0 TABLET ORAL
Qty: 0 | Refills: 0 | DISCHARGE

## 2022-02-12 RX ORDER — ASPIRIN/CALCIUM CARB/MAGNESIUM 324 MG
1 TABLET ORAL
Qty: 30 | Refills: 0
Start: 2022-02-12 | End: 2022-03-13

## 2022-02-12 RX ORDER — METFORMIN HYDROCHLORIDE 850 MG/1
0 TABLET ORAL
Qty: 0 | Refills: 0 | DISCHARGE

## 2022-02-12 RX ORDER — CLOPIDOGREL BISULFATE 75 MG/1
0 TABLET, FILM COATED ORAL
Qty: 0 | Refills: 0 | DISCHARGE

## 2022-02-12 RX ADMIN — Medication 2: at 11:42

## 2022-02-12 RX ADMIN — LOSARTAN POTASSIUM 100 MILLIGRAM(S): 100 TABLET, FILM COATED ORAL at 06:23

## 2022-02-12 RX ADMIN — Medication 1: at 07:40

## 2022-02-12 RX ADMIN — Medication 200 MILLIGRAM(S): at 06:23

## 2022-02-12 RX ADMIN — CLOPIDOGREL BISULFATE 75 MILLIGRAM(S): 75 TABLET, FILM COATED ORAL at 11:41

## 2022-02-12 RX ADMIN — Medication 81 MILLIGRAM(S): at 11:41

## 2022-02-12 RX ADMIN — Medication 25 MILLIGRAM(S): at 06:23

## 2022-02-12 NOTE — PROGRESS NOTE ADULT - ASSESSMENT
61-year-old with dyslipidemia, hypertension, type 2 diabetes, known CAD and previous CABG in 2009, prior PCI's in 2015, PAD, history of PTCA in 4/21.  c/o dyspnea and midsternal chest discomfort intermittently for the last several months.  +reproducible chest wall pain and right shoulder pain too.    No evidence of ACS present, ECG troponins negative  Continue aspirin & plavix plus statin for cardiac risk reduction and lipid-lowering.  Continue antihypertensives losartan, metoprolol and hydrochlorothiazide.   Had a prior stress test perhaps about 6 months earlier and her outside cardiology office, reportedly negative.  Need for ortho consultation?  Suggest pain management and continued followup with outside cardiologist for further care of chronic chest pain issues. 61-year-old with dyslipidemia, hypertension, type 2 diabetes, known CAD and previous CABG in 2009, prior PCI's in 2015, PAD, history of PTCA in 4/21.  c/o dyspnea and midsternal chest discomfort intermittently for the last several months.  +reproducible chest wall pain and right shoulder pain too.    No evidence of ACS present, ECG troponins negative  Continue aspirin & plavix plus statin for cardiac risk reduction and lipid-lowering.  Continue antihypertensives losartan, metoprolol and hydrochlorothiazide.   Had a prior stress test perhaps about 6 months earlier and her outside cardiology office, reportedly negative.  Need for ortho consultation?  Suggest pain management and continued followup with outside cardiologist (Dr. Coleman) for further care of chronic chest pain issues.  Will follow only as needed.

## 2022-02-12 NOTE — DISCHARGE NOTE PROVIDER - HOSPITAL COURSE
Resting in bed NAD. No acute events. No arrhythmia on tele ovenight. Feels well denies palpitations. denies sob or cp. 155/95 91 bpm ns      Physical Exam: CONSTITUTIONAL: Well developed, well nourished, alert and cooperative, no acute distress.  EYES: PERRL, no scleral icterus  ENT: Mucosa moist, tongue normal.   NECK: Neck supple, trachea midline, non-tender  CARDIAC: Normal S1 and S2. Regular rate and rhythms. No murmurs. No Pedal edema. Peripheral pulses intact  LUNGS: Clear to auscultation, equal air entry both lungs. No rales, rhonchi, wheezing. Normal respiratory effort.   ABDOMEN: Soft, nondistended, nontender. No guarding or rebound tenderness. No hepatomegaly or splenomegaly. Bowel sound normal.   MUSCULOSKELETAL: Normocephalic, atraumatic. No clubbing or cyanosis. No significant deformity or joint abnormality  NEUROLOGICAL: No gross motor or sensory deficits.  SKIN: no lesions or eruptions. Normal turgor  PSYCHIATRIC: A&O x 3, appropriate mood and affect    61 years old female with h/o HTN, HLD, DM, CAD s/p CABG in 2009, PCI with 3 stents in 2015, PAD s/p angioplasty in 04/2021 present to ED with complain SOB with exertion and intermittent chest discomfort  Hemodynamically stable, afebrile, sat well at RA. EKG with NSR, VR 66, QTc 406, TWI in I, II, V 4,5,6. No leukocytosis, plt 287, ddimer negative, Cr 1.52, K 4.4, hsTnT 12.1. CXR image reviewed, no focal consolidation.     Admitted with chest pain, SOB     Problem/Plan - 1:  ·  Problem: Atypical chest pain.   ·  Plan: present with intermittent chest discomfort, no clear relation to physical activity  h/o CAD s/p CABG in 2009, PCI with 3 stents in 2015  EKG with NSR, VR 66, QTc 406, TWI in I, II, V 4,5,6  hsTnT 12.1  High HEART score  Serial troponin/CK/CKMB  on aspirin, plavix, statin, BB  ECHO, telemetry monitoring  ED consulted cardiology  nitroglycerin prn.     Problem/Plan - 2:  ·  Problem: SOB (shortness of breath) on exertion.   ·  Plan: SOB on exertion for 2 months  Clinically appear euvolemic  ECHO  Monitor I/O, daily weight.     Problem/Plan - 3:  ·  Problem: Intermittent claudication.   ·  Plan: PAD s/p angioplasty in 04/2021, reported intermittent claudication  on aspirin, plavix, statin  RUPALI.     Problem/Plan - 4:  ·  Problem: Benign essential HTN.   ·  Plan: Monitor BP and titrate BP meds.     Problem/Plan - 5:  ·  Problem: Hyperlipidemia, unspecified.   ·  Plan: statin.     Problem/Plan - 6:  ·  Problem: Type 2 diabetes mellitus with unspecified complications.   ·  Plan: ISS, hypoglycemia protocol  Check A1c.     Problem/Plan - 7:  ·  Problem: History of coronary artery bypass graft.   ·  Plan: CAD s/p CABG in 2009, PCI with 3 stents in 2015  Continue aspirin, statin, BB, losartan.     Problem/Plan - 8:  ·  Problem: Right shoulder pain.   ·  Plan: right shoulder pain for 3 months  ROM intact, no focal tenderness  No fall or trauma  Xray shoulder  tylenol prn.    TTE EF 65% G1DD, MILD LVD TROP (-) X 3 NO EVIDENCE OF ACS. HAD NEGATIVE STRESS TEST 6 MO AGO OUTPATIENT. CARDIO CONSULT SIGNED OFF. F/U OUTPATIENT WITH CARDIO

## 2022-02-12 NOTE — DISCHARGE NOTE PROVIDER - NSDCMRMEDTOKEN_GEN_ALL_CORE_FT
clopidogrel 75 mg oral tablet: 1 tab(s) orally once a day  hydroCHLOROthiazide 25 mg oral tablet: 1 tab(s) orally once a day  Lopressor:   losartan:   losartan 100 mg oral tablet: 1 tab(s) orally once a day  metFORMIN:   metoprolol succinate 200 mg oral tablet, extended release: 1 tab(s) orally once a day  Plavix 300 mg oral tablet:   simvastatin:    clopidogrel 75 mg oral tablet: 1 tab(s) orally once a day  hydroCHLOROthiazide 25 mg oral tablet: 1 tab(s) orally once a day  losartan 100 mg oral tablet: 1 tab(s) orally once a day  metoprolol succinate 200 mg oral tablet, extended release: 1 tab(s) orally once a day   amLODIPine 5 mg oral tablet: 1 tab(s) orally once a day   Aspirin Low Strength 81 mg oral delayed release tablet: 1 tab(s) orally once a day   clopidogrel 75 mg oral tablet: 1 tab(s) orally once a day  Farxiga 10 mg oral tablet: 1 tab(s) orally once a day   glimepiride 2 mg oral tablet: 1 tab(s) orally once a day   hydroCHLOROthiazide 25 mg oral tablet: 1 tab(s) orally once a day  metFORMIN 850 mg oral tablet: 1 tab(s) orally 2 times a day   metoprolol succinate 200 mg oral tablet, extended release: 1 tab(s) orally once a day  simvastatin 40 mg oral tablet: 1 tab(s) orally once a day (at bedtime)    Aspirin Low Strength 81 mg oral delayed release tablet: 1 tab(s) orally once a day   clopidogrel 75 mg oral tablet: 1 tab(s) orally once a day  Farxiga 10 mg oral tablet: 1 tab(s) orally once a day   glimepiride 2 mg oral tablet: 1 tab(s) orally once a day   hydroCHLOROthiazide 25 mg oral tablet: 1 tab(s) orally once a day  losartan 100 mg oral tablet: 1 tab(s) orally once a day   metFORMIN 850 mg oral tablet: 1 tab(s) orally 2 times a day   metoprolol succinate 200 mg oral tablet, extended release: 1 tab(s) orally once a day  simvastatin 40 mg oral tablet: 1 tab(s) orally once a day (at bedtime)

## 2022-02-12 NOTE — PROGRESS NOTE ADULT - SUBJECTIVE AND OBJECTIVE BOX
CHIEF COMPLAINT:  Patient is a 61y old  Female who presents with a chief complaint of chest pain, SOB on exertion (11 Feb 2022 15:09)   HPI: She is a pleasant 61-year-old with dyslipidemia, hypertension, type 2 diabetes, known CAD and previous CABG in 2009, prior PCI's in 2015, PAD, history of PTCA in 4/21, admitted with shortness of breath and chest discomfort intermittently for the last several months.  She has midsternal chest discomfort but also reproducible chest wall symptom.  Has had right shoulder pain for the last several months as well.   ALLERGIES: No Known Allergies  Home Medications: Lopressor:  (11 Feb 2022 12:35) losartan:  (11 Feb 2022 12:35) metFORMIN:  (11 Feb 2022 12:35) Plavix 300 mg oral tablet:  (11 Feb 2022 12:35) simvastatin:  (11 Feb 2022 12:35)  PAST MEDICAL & SURGICAL HISTORY: CAD (coronary artery disease)  CABG (coronary artery bypass graft)  Diabetes mellitus type II  Dyslipidemia  HTN  S/P CABG x 3     FAMILY HISTORY: FH: HTN (hypertension) (Mother)    SOCIAL HISTORY: denied tobacco  MEDICATIONS  (STANDING): aspirin enteric coated 81 milliGRAM(s) Oral daily atorvastatin 40 milliGRAM(s) Oral at bedtime clopidogrel Tablet 75 milliGRAM(s) Oral daily hydrochlorothiazide 25 milliGRAM(s) Oral daily insulin lispro (ADMELOG) corrective regimen sliding scale   SubCutaneous three times a day before meals insulin lispro (ADMELOG) corrective regimen sliding scale   SubCutaneous at bedtime losartan 100 milliGRAM(s) Oral daily metoprolol succinate  milliGRAM(s) Oral daily  Vital Signs Last 24 Hrs T(C): 37 (12 Feb 2022 05:03), Max: 37 (12 Feb 2022 05:03) T(F): 98.6 (12 Feb 2022 05:03), Max: 98.6 (12 Feb 2022 05:03) HR: 63 (12 Feb 2022 05:03) (60 - 70) BP: 138/82 (12 Feb 2022 05:03) (113/63 - 147/69) BP(mean): -- RR: 18 (12 Feb 2022 05:03) (17 - 20) SpO2: 95% (12 Feb 2022 05:03) (93% - 100%)  Constitutional: well developed, normal appearance, well groomed, well nourished, no deformities and no acute distress.  Eyes: the conjunctiva exhibited no abnormalities and the eyelids demonstrated no xanthelasmas.  HEENT: normal oral mucosa, no oral pallor and no oral cyanosis.  Neck: normal jugular venous A waves present, normal jugular venous V waves present and no jugular venous epstein A waves.  Pulmonary: no respiratory distress, normal respiratory rhythm and effort, no accessory muscle use and lungs were clear to auscultation bilaterally.  Cardiovascular: heart rate and rhythm were normal, normal S1 and S2 and no murmur, gallop, rub, heave or thrill are present.  Abdomen: soft, non-tender  Musculoskeletal: the gait could not be assessed.  Extremities: no clubbing of the fingernails, no localized cyanosis, no petechial hemorrhages and no ischemic changes.  Skin: normal skin color and pigmentation, no rash, no venous stasis, no skin lesions, no skin ulcer and no xanthoma was observed.  Psychiatric: oriented to person, place, and time, the affect was normal, the mood was normal and not feeling anxious.    LABORATORY:                       13.1  7.61  )-----------( 287      ( 11 Feb 2022 12:47 )            39.0   02-12  139  |  107  |  29<H> ----------------------------<  162<H> 3.8   |  25  |  1.23  Ca    9.2      12 Feb 2022 06:18  TPro  6.9  /  Alb  3.0<L>  /  TBili  0.4  /  DBili  x   /  AST  20  /  ALT  23  /  AlkPhos  59  02-12 02-11  138  |  104  |  33<H> ----------------------------<  234<H> 4.4   |  27  |  1.52<H>  Ca    9.7      11 Feb 2022 12:47  TPro  8.0  /  Alb  3.6  /  TBili  0.4  /  DBili  x   /  AST  25  /  ALT  30  /  AlkPhos  69  02-11   CARDIAC MARKERS ( 11 Feb 2022 16:32 ) x     / x     / 291 U/L / x     / 1.2 ng/mL   CAPILLARY BLOOD GLUCOSE POCT Blood Glucose.: 126 mg/dL (11 Feb 2022 18:16)  LIVER FUNCTIONS - ( 11 Feb 2022 12:47 ) Alb: 3.6 g/dL / Pro: 8.0 gm/dL / ALK PHOS: 69 U/L / ALT: 30 U/L / AST: 25 U/L / GGT: x         PT/INR - ( 11 Feb 2022 12:47 )   PT: 11.2 sec;   INR: 0.96 ratio      PTT - ( 11 Feb 2022 12:47 )  PTT:26.9 sec  BNPSerum Pro-Brain Natriuretic Peptide: 60 pg/mL (02-11-22 @ 16:32)   IMAGING: < from: 12 Lead ECG (02.11.22 @ 11:21) > Normal sinus rhythm Minimal voltage criteria for LVH, may be normal variant Nonspecific ST and T wave abnormality Abnormal ECG No previous ECGs available  < end of copied text >  < from: Xray Chest 1 View- PORTABLE-Urgent (02.11.22 @ 14:30) > IMPRESSION: No acute finding or change.  < end of copied text >  < from: TTE Echo Complete w/o Contrast w/ Doppler (02.11.22 @ 16:54) > Summary:  1. Left ventricular ejection fraction, by visual estimation, is 60 to  65%.  2. Technically fair study.  3. Normal global left ventricular systolic function.  4. Mildly increased LV wall thickness.  5. Normal left ventricular internal cavity size.  6. Spectral Doppler showsimpaired relaxation pattern of left  ventricular myocardial filling (Grade I diastolic dysfunction).  7. Normal right ventricular size and function.  8. Normal left atrial size.  9. Normal right atrial size. 10. There is no evidence of pericardial effusion. 11. Mild tricuspid regurgitation. 12. Sclerotic aortic valve with normal opening. 13. Moderate pulmonic valve regurgitation. 14. Increased relative wall thickness with normal mass index consistent  with left ventricular concentric remodeling.  < end of copied text >    CHIEF COMPLAINT:  Patient is a 61y old  Female who presents with a chief complaint of chest pain, SOB on exertion (11 Feb 2022 15:09)   HPI: She is a pleasant 61-year-old with dyslipidemia, hypertension, type 2 diabetes, known CAD and previous CABG in 2009, prior PCI's in 2015, PAD, history of PTCA in 4/21, admitted with shortness of breath and chest discomfort intermittently for the last several months.  She has midsternal chest discomfort but also reproducible chest wall symptom.  Has had right shoulder pain for the last several months as well. Stress test done recently by primary cardiologist (Dr. Coleman) was reportedly normal.   ALLERGIES: No Known Allergies  Home Medications: Lopressor:  (11 Feb 2022 12:35) losartan:  (11 Feb 2022 12:35) metFORMIN:  (11 Feb 2022 12:35) Plavix 300 mg oral tablet:  (11 Feb 2022 12:35) simvastatin:  (11 Feb 2022 12:35)  PAST MEDICAL & SURGICAL HISTORY: CAD (coronary artery disease)  CABG (coronary artery bypass graft)  Diabetes mellitus type II  Dyslipidemia  HTN  S/P CABG x 3     FAMILY HISTORY: FH: HTN (hypertension) (Mother)    SOCIAL HISTORY: denied tobacco  MEDICATIONS  (STANDING): aspirin enteric coated 81 milliGRAM(s) Oral daily atorvastatin 40 milliGRAM(s) Oral at bedtime clopidogrel Tablet 75 milliGRAM(s) Oral daily hydrochlorothiazide 25 milliGRAM(s) Oral daily insulin lispro (ADMELOG) corrective regimen sliding scale   SubCutaneous three times a day before meals insulin lispro (ADMELOG) corrective regimen sliding scale   SubCutaneous at bedtime losartan 100 milliGRAM(s) Oral daily metoprolol succinate  milliGRAM(s) Oral daily  Vital Signs Last 24 Hrs T(C): 37 (12 Feb 2022 05:03), Max: 37 (12 Feb 2022 05:03) T(F): 98.6 (12 Feb 2022 05:03), Max: 98.6 (12 Feb 2022 05:03) HR: 63 (12 Feb 2022 05:03) (60 - 70) BP: 138/82 (12 Feb 2022 05:03) (113/63 - 147/69) BP(mean): -- RR: 18 (12 Feb 2022 05:03) (17 - 20) SpO2: 95% (12 Feb 2022 05:03) (93% - 100%)  Constitutional: well developed, normal appearance, well groomed, well nourished, no deformities and no acute distress.  Eyes: the conjunctiva exhibited no abnormalities and the eyelids demonstrated no xanthelasmas.  HEENT: normal oral mucosa, no oral pallor and no oral cyanosis.  Neck: normal jugular venous A waves present, normal jugular venous V waves present and no jugular venous epstein A waves.  Pulmonary: no respiratory distress, normal respiratory rhythm and effort, no accessory muscle use and lungs were clear to auscultation bilaterally.  Cardiovascular: heart rate and rhythm were normal, normal S1 and S2 and no murmur, gallop, rub, heave or thrill are present.  Abdomen: soft, non-tender  Musculoskeletal: the gait could not be assessed.  Extremities: no clubbing of the fingernails, no localized cyanosis, no petechial hemorrhages and no ischemic changes.  Skin: normal skin color and pigmentation, no rash, no venous stasis, no skin lesions, no skin ulcer and no xanthoma was observed.  Psychiatric: oriented to person, place, and time, the affect was normal, the mood was normal and not feeling anxious.    LABORATORY:                       13.1  7.61  )-----------( 287      ( 11 Feb 2022 12:47 )            39.0   02-12  139  |  107  |  29<H> ----------------------------<  162<H> 3.8   |  25  |  1.23  Ca    9.2      12 Feb 2022 06:18  TPro  6.9  /  Alb  3.0<L>  /  TBili  0.4  /  DBili  x   /  AST  20  /  ALT  23  /  AlkPhos  59  02-12 02-11  138  |  104  |  33<H> ----------------------------<  234<H> 4.4   |  27  |  1.52<H>  Ca    9.7      11 Feb 2022 12:47  TPro  8.0  /  Alb  3.6  /  TBili  0.4  /  DBili  x   /  AST  25  /  ALT  30  /  AlkPhos  69  02-11   CARDIAC MARKERS ( 11 Feb 2022 16:32 ) x     / x     / 291 U/L / x     / 1.2 ng/mL   CAPILLARY BLOOD GLUCOSE POCT Blood Glucose.: 126 mg/dL (11 Feb 2022 18:16)  LIVER FUNCTIONS - ( 11 Feb 2022 12:47 ) Alb: 3.6 g/dL / Pro: 8.0 gm/dL / ALK PHOS: 69 U/L / ALT: 30 U/L / AST: 25 U/L / GGT: x         PT/INR - ( 11 Feb 2022 12:47 )   PT: 11.2 sec;   INR: 0.96 ratio      PTT - ( 11 Feb 2022 12:47 )  PTT:26.9 sec  BNPSerum Pro-Brain Natriuretic Peptide: 60 pg/mL (02-11-22 @ 16:32)   IMAGING: < from: 12 Lead ECG (02.11.22 @ 11:21) > Normal sinus rhythm Minimal voltage criteria for LVH, may be normal variant Nonspecific ST and T wave abnormality Abnormal ECG No previous ECGs available  < end of copied text >  < from: Xray Chest 1 View- PORTABLE-Urgent (02.11.22 @ 14:30) > IMPRESSION: No acute finding or change.  < end of copied text >  < from: TTE Echo Complete w/o Contrast w/ Doppler (02.11.22 @ 16:54) > Summary:  1. Left ventricular ejection fraction, by visual estimation, is 60 to  65%.  2. Technically fair study.  3. Normal global left ventricular systolic function.  4. Mildly increased LV wall thickness.  5. Normal left ventricular internal cavity size.  6. Spectral Doppler showsimpaired relaxation pattern of left  ventricular myocardial filling (Grade I diastolic dysfunction).  7. Normal right ventricular size and function.  8. Normal left atrial size.  9. Normal right atrial size. 10. There is no evidence of pericardial effusion. 11. Mild tricuspid regurgitation. 12. Sclerotic aortic valve with normal opening. 13. Moderate pulmonic valve regurgitation. 14. Increased relative wall thickness with normal mass index consistent  with left ventricular concentric remodeling.  < end of copied text >

## 2022-02-12 NOTE — DISCHARGE NOTE NURSING/CASE MANAGEMENT/SOCIAL WORK - PATIENT PORTAL LINK FT
You can access the FollowMyHealth Patient Portal offered by [facility] by registering at the following website: [email or web address]. By joining [employer]’s FollowMyHealth portal, you will also be able to view your health information using other applications (apps) compatible with our system.

## 2022-02-15 DIAGNOSIS — I25.10 ATHEROSCLEROTIC HEART DISEASE OF NATIVE CORONARY ARTERY WITHOUT ANGINA PECTORIS: ICD-10-CM

## 2022-02-15 DIAGNOSIS — E11.51 TYPE 2 DIABETES MELLITUS WITH DIABETIC PERIPHERAL ANGIOPATHY WITHOUT GANGRENE: ICD-10-CM

## 2022-02-15 DIAGNOSIS — E78.5 HYPERLIPIDEMIA, UNSPECIFIED: ICD-10-CM

## 2022-02-15 DIAGNOSIS — Z79.84 LONG TERM (CURRENT) USE OF ORAL HYPOGLYCEMIC DRUGS: ICD-10-CM

## 2022-02-15 DIAGNOSIS — R07.89 OTHER CHEST PAIN: ICD-10-CM

## 2022-02-15 DIAGNOSIS — Z95.1 PRESENCE OF AORTOCORONARY BYPASS GRAFT: ICD-10-CM

## 2022-02-15 DIAGNOSIS — R07.9 CHEST PAIN, UNSPECIFIED: ICD-10-CM

## 2022-02-15 DIAGNOSIS — Z79.02 LONG TERM (CURRENT) USE OF ANTITHROMBOTICS/ANTIPLATELETS: ICD-10-CM

## 2022-02-15 DIAGNOSIS — Z95.5 PRESENCE OF CORONARY ANGIOPLASTY IMPLANT AND GRAFT: ICD-10-CM

## 2022-02-15 DIAGNOSIS — I73.9 PERIPHERAL VASCULAR DISEASE, UNSPECIFIED: ICD-10-CM

## 2022-02-15 DIAGNOSIS — M25.511 PAIN IN RIGHT SHOULDER: ICD-10-CM

## 2022-02-15 DIAGNOSIS — I10 ESSENTIAL (PRIMARY) HYPERTENSION: ICD-10-CM

## 2023-05-09 ENCOUNTER — EMERGENCY (EMERGENCY)
Facility: HOSPITAL | Age: 63
LOS: 1 days | Discharge: HOME | End: 2023-05-09
Attending: EMERGENCY MEDICINE | Admitting: EMERGENCY MEDICINE
Payer: COMMERCIAL

## 2023-05-09 VITALS
TEMPERATURE: 99 F | HEART RATE: 62 BPM | OXYGEN SATURATION: 100 % | SYSTOLIC BLOOD PRESSURE: 168 MMHG | DIASTOLIC BLOOD PRESSURE: 75 MMHG | RESPIRATION RATE: 18 BRPM

## 2023-05-09 VITALS
HEART RATE: 67 BPM | OXYGEN SATURATION: 100 % | DIASTOLIC BLOOD PRESSURE: 70 MMHG | SYSTOLIC BLOOD PRESSURE: 180 MMHG | RESPIRATION RATE: 16 BRPM

## 2023-05-09 LAB
ALBUMIN SERPL ELPH-MCNC: 4 G/DL — SIGNIFICANT CHANGE UP (ref 3.3–5)
ALP SERPL-CCNC: 68 U/L — SIGNIFICANT CHANGE UP (ref 40–120)
ALT FLD-CCNC: 13 U/L — SIGNIFICANT CHANGE UP (ref 4–33)
ANION GAP SERPL CALC-SCNC: 11 MMOL/L — SIGNIFICANT CHANGE UP (ref 7–14)
APPEARANCE UR: CLEAR — SIGNIFICANT CHANGE UP
AST SERPL-CCNC: 16 U/L — SIGNIFICANT CHANGE UP (ref 4–32)
BILIRUB SERPL-MCNC: <0.2 MG/DL — SIGNIFICANT CHANGE UP (ref 0.2–1.2)
BILIRUB UR-MCNC: NEGATIVE — SIGNIFICANT CHANGE UP
BUN SERPL-MCNC: 27 MG/DL — HIGH (ref 7–23)
CALCIUM SERPL-MCNC: 9.1 MG/DL — SIGNIFICANT CHANGE UP (ref 8.4–10.5)
CHLORIDE SERPL-SCNC: 106 MMOL/L — SIGNIFICANT CHANGE UP (ref 98–107)
CO2 SERPL-SCNC: 23 MMOL/L — SIGNIFICANT CHANGE UP (ref 22–31)
COLOR SPEC: SIGNIFICANT CHANGE UP
CREAT SERPL-MCNC: 1.58 MG/DL — HIGH (ref 0.5–1.3)
DIFF PNL FLD: NEGATIVE — SIGNIFICANT CHANGE UP
EGFR: 36 ML/MIN/1.73M2 — LOW
GLUCOSE SERPL-MCNC: 146 MG/DL — HIGH (ref 70–99)
GLUCOSE UR QL: NEGATIVE — SIGNIFICANT CHANGE UP
HCT VFR BLD CALC: 34.6 % — SIGNIFICANT CHANGE UP (ref 34.5–45)
HGB BLD-MCNC: 11.2 G/DL — LOW (ref 11.5–15.5)
KETONES UR-MCNC: NEGATIVE — SIGNIFICANT CHANGE UP
LEUKOCYTE ESTERASE UR-ACNC: NEGATIVE — SIGNIFICANT CHANGE UP
MCHC RBC-ENTMCNC: 29.6 PG — SIGNIFICANT CHANGE UP (ref 27–34)
MCHC RBC-ENTMCNC: 32.4 GM/DL — SIGNIFICANT CHANGE UP (ref 32–36)
MCV RBC AUTO: 91.5 FL — SIGNIFICANT CHANGE UP (ref 80–100)
NITRITE UR-MCNC: NEGATIVE — SIGNIFICANT CHANGE UP
NRBC # BLD: 0 /100 WBCS — SIGNIFICANT CHANGE UP (ref 0–0)
NRBC # FLD: 0 K/UL — SIGNIFICANT CHANGE UP (ref 0–0)
PH UR: 6 — SIGNIFICANT CHANGE UP (ref 5–8)
PLATELET # BLD AUTO: 240 K/UL — SIGNIFICANT CHANGE UP (ref 150–400)
POTASSIUM SERPL-MCNC: 4.1 MMOL/L — SIGNIFICANT CHANGE UP (ref 3.5–5.3)
POTASSIUM SERPL-SCNC: 4.1 MMOL/L — SIGNIFICANT CHANGE UP (ref 3.5–5.3)
PROT SERPL-MCNC: 7.1 G/DL — SIGNIFICANT CHANGE UP (ref 6–8.3)
PROT UR-MCNC: ABNORMAL
RBC # BLD: 3.78 M/UL — LOW (ref 3.8–5.2)
RBC # FLD: 13 % — SIGNIFICANT CHANGE UP (ref 10.3–14.5)
SODIUM SERPL-SCNC: 140 MMOL/L — SIGNIFICANT CHANGE UP (ref 135–145)
SP GR SPEC: 1.02 — SIGNIFICANT CHANGE UP (ref 1.01–1.05)
UROBILINOGEN FLD QL: SIGNIFICANT CHANGE UP
WBC # BLD: 7.73 K/UL — SIGNIFICANT CHANGE UP (ref 3.8–10.5)
WBC # FLD AUTO: 7.73 K/UL — SIGNIFICANT CHANGE UP (ref 3.8–10.5)

## 2023-05-09 PROCEDURE — 99284 EMERGENCY DEPT VISIT MOD MDM: CPT

## 2023-05-09 RX ORDER — LIDOCAINE 4 G/100G
1 CREAM TOPICAL ONCE
Refills: 0 | Status: COMPLETED | OUTPATIENT
Start: 2023-05-09 | End: 2023-05-09

## 2023-05-09 RX ORDER — LIDOCAINE 4 G/100G
1 CREAM TOPICAL
Qty: 14 | Refills: 0
Start: 2023-05-09

## 2023-05-09 RX ORDER — SODIUM CHLORIDE 9 MG/ML
1000 INJECTION INTRAMUSCULAR; INTRAVENOUS; SUBCUTANEOUS ONCE
Refills: 0 | Status: COMPLETED | OUTPATIENT
Start: 2023-05-09 | End: 2023-05-09

## 2023-05-09 RX ORDER — OXYCODONE AND ACETAMINOPHEN 5; 325 MG/1; MG/1
1 TABLET ORAL ONCE
Refills: 0 | Status: DISCONTINUED | OUTPATIENT
Start: 2023-05-09 | End: 2023-05-09

## 2023-05-09 RX ORDER — ACETAMINOPHEN 500 MG
2 TABLET ORAL
Qty: 100 | Refills: 0
Start: 2023-05-09

## 2023-05-09 RX ORDER — CYCLOBENZAPRINE HYDROCHLORIDE 10 MG/1
1 TABLET, FILM COATED ORAL
Qty: 15 | Refills: 0
Start: 2023-05-09

## 2023-05-09 RX ORDER — CYCLOBENZAPRINE HYDROCHLORIDE 10 MG/1
5 TABLET, FILM COATED ORAL ONCE
Refills: 0 | Status: COMPLETED | OUTPATIENT
Start: 2023-05-09 | End: 2023-05-09

## 2023-05-09 RX ORDER — ACETAMINOPHEN 500 MG
650 TABLET ORAL ONCE
Refills: 0 | Status: COMPLETED | OUTPATIENT
Start: 2023-05-09 | End: 2023-05-09

## 2023-05-09 RX ORDER — IBUPROFEN 200 MG
1 TABLET ORAL
Qty: 50 | Refills: 0
Start: 2023-05-09

## 2023-05-09 RX ORDER — KETOROLAC TROMETHAMINE 30 MG/ML
15 SYRINGE (ML) INJECTION ONCE
Refills: 0 | Status: DISCONTINUED | OUTPATIENT
Start: 2023-05-09 | End: 2023-05-09

## 2023-05-09 RX ADMIN — Medication 15 MILLIGRAM(S): at 19:40

## 2023-05-09 RX ADMIN — Medication 15 MILLIGRAM(S): at 19:27

## 2023-05-09 RX ADMIN — Medication 650 MILLIGRAM(S): at 19:27

## 2023-05-09 RX ADMIN — CYCLOBENZAPRINE HYDROCHLORIDE 5 MILLIGRAM(S): 10 TABLET, FILM COATED ORAL at 19:18

## 2023-05-09 RX ADMIN — LIDOCAINE 1 PATCH: 4 CREAM TOPICAL at 19:16

## 2023-05-09 RX ADMIN — SODIUM CHLORIDE 1000 MILLILITER(S): 9 INJECTION INTRAMUSCULAR; INTRAVENOUS; SUBCUTANEOUS at 19:27

## 2023-05-09 NOTE — ED PROVIDER NOTE - PATIENT PORTAL LINK FT
You can access the FollowMyHealth Patient Portal offered by [facility] by registering at the following website: [email or web address]. By joining [employer]’s FollowMyHealth portal, you will also be able to view your health information using other applications (apps) compatible with our system.

## 2023-05-09 NOTE — ED ADULT NURSE NOTE - NSICDXPASTMEDICALHX_GEN_ALL_CORE_FT
PAST MEDICAL HISTORY:  CABG (coronary artery bypass graft)     CAD (coronary artery disease)     Diabetes mellitus type II     Dyslipidemia     HTN     
Stable

## 2023-05-09 NOTE — ED PROVIDER NOTE - NSFOLLOWUPINSTRUCTIONS_ED_ALL_ED_FT
Take medications as prescribed for: musculoskeletal pain.    Return if pain not controlled with oral medications or if notice blood in the urine.

## 2023-05-09 NOTE — ED PROVIDER NOTE - CLINICAL SUMMARY MEDICAL DECISION MAKING FREE TEXT BOX
Ermelinda: Sudden-onset R flank pain. Consider renal stone, AAA, MSK pain. Give pain meds/muscle relaxants. CT abd/pelvis. Check UA. Ermelinda: Sudden-onset R flank pain. Consider renal stone, MSK pain, AAA (less likely, given no pulsatile abdominal mass). Give pain meds/muscle relaxants. CT abd/pelvis. Check UA.

## 2023-05-09 NOTE — ED PROVIDER NOTE - PHYSICAL EXAMINATION
Well-appearing, well nourished, awake, alert, oriented to person, place, time/situation and in no apparent distress.    Airway patent. Neck supple.    Eyes without scleral injection. No jaundice.    Strong pulse.    Respirations unlabored.    Abdomen soft, non-tender, no guarding.    MSK: L CVAT. Pain worse w/ twisting her back.    Alert and oriented, no gross motor or sensory deficits.    Skin: normal color for race, warm, dry and intact. No evidence of rash.    No SI/HI. Well-appearing, well nourished, awake, alert, oriented to person, place, time/situation and in no apparent distress.    Airway patent. Neck supple.    Eyes without scleral injection. No jaundice.    Strong pulse.    Respirations unlabored.    Abdomen soft, non-tender, no guarding. No pulsatile abdominal mass.    MSK: L CVAT. Pain worse w/ twisting her back.    Alert and oriented, no gross motor or sensory deficits.    Skin: normal color for race, warm, dry and intact. No evidence of rash.    No SI/HI.

## 2023-05-09 NOTE — ED PROVIDER NOTE - OBJECTIVE STATEMENT
Ermelinda: History of diabetes and 3-vessel CABG.  Today, developed sudden-onset right flank pain that is worse with movement.  No radiation.  No  symptoms.  No nausea.  No trauma.  No fever. H/o cholecystectomy. No allergies.

## 2023-06-02 ENCOUNTER — APPOINTMENT (OUTPATIENT)
Dept: UROLOGY | Facility: CLINIC | Age: 63
End: 2023-06-02

## 2023-06-19 ENCOUNTER — APPOINTMENT (OUTPATIENT)
Dept: UROLOGY | Facility: CLINIC | Age: 63
End: 2023-06-19

## 2024-05-26 NOTE — ED PROVIDER NOTE - NSICDXPASTMEDICALHX_GEN_ALL_CORE_FT
medium
PAST MEDICAL HISTORY:  CABG (coronary artery bypass graft)     CAD (coronary artery disease)     Diabetes mellitus type II     Dyslipidemia     HTN

## 2024-12-19 PROBLEM — E78.2 HYPERLIPIDEMIA, MIXED: Status: ACTIVE | Noted: 2024-12-19

## 2024-12-19 PROBLEM — I10 BENIGN HYPERTENSION: Status: ACTIVE | Noted: 2024-12-19

## 2024-12-19 PROBLEM — E11.69 TYPE 2 DIABETES MELLITUS WITH OTHER SPECIFIED COMPLICATION, UNSPECIFIED WHETHER LONG TERM INSULIN USE: Status: ACTIVE | Noted: 2024-12-19

## 2024-12-19 PROBLEM — Z78.9 NON-SMOKER: Status: ACTIVE | Noted: 2024-12-19

## 2024-12-19 PROBLEM — I73.9 PVD (PERIPHERAL VASCULAR DISEASE): Status: ACTIVE | Noted: 2024-12-19

## 2024-12-19 RX ORDER — CLOPIDOGREL 75 MG/1
75 TABLET, FILM COATED ORAL DAILY
Refills: 0 | Status: ACTIVE | COMMUNITY

## 2024-12-19 RX ORDER — METOPROLOL SUCCINATE 200 MG/1
200 TABLET, EXTENDED RELEASE ORAL
Qty: 180 | Refills: 0 | Status: ACTIVE | COMMUNITY

## 2024-12-19 RX ORDER — SIMVASTATIN 80 MG/1
80 TABLET, FILM COATED ORAL
Qty: 90 | Refills: 3 | Status: ACTIVE | COMMUNITY

## 2024-12-23 ENCOUNTER — APPOINTMENT (OUTPATIENT)
Dept: CARDIOTHORACIC SURGERY | Facility: CLINIC | Age: 64
End: 2024-12-23
Payer: COMMERCIAL

## 2024-12-23 VITALS
HEART RATE: 55 BPM | TEMPERATURE: 98.5 F | OXYGEN SATURATION: 98 % | WEIGHT: 138 LBS | HEIGHT: 60 IN | SYSTOLIC BLOOD PRESSURE: 178 MMHG | DIASTOLIC BLOOD PRESSURE: 77 MMHG | BODY MASS INDEX: 27.09 KG/M2 | RESPIRATION RATE: 17 BRPM

## 2024-12-23 DIAGNOSIS — I10 ESSENTIAL (PRIMARY) HYPERTENSION: ICD-10-CM

## 2024-12-23 DIAGNOSIS — E11.69 TYPE 2 DIABETES MELLITUS WITH OTHER SPECIFIED COMPLICATION: ICD-10-CM

## 2024-12-23 DIAGNOSIS — I77.810 THORACIC AORTIC ECTASIA: ICD-10-CM

## 2024-12-23 DIAGNOSIS — I73.9 PERIPHERAL VASCULAR DISEASE, UNSPECIFIED: ICD-10-CM

## 2024-12-23 DIAGNOSIS — Z78.9 OTHER SPECIFIED HEALTH STATUS: ICD-10-CM

## 2024-12-23 DIAGNOSIS — I71.9 AORTIC ANEURYSM OF UNSPECIFIED SITE, W/OUT RUPTURE: ICD-10-CM

## 2024-12-23 DIAGNOSIS — E78.2 MIXED HYPERLIPIDEMIA: ICD-10-CM

## 2024-12-23 PROCEDURE — 99204 OFFICE O/P NEW MOD 45 MIN: CPT

## 2024-12-23 RX ORDER — GLIMEPIRIDE 2 MG/1
2 TABLET ORAL
Refills: 0 | Status: ACTIVE | COMMUNITY

## 2024-12-23 RX ORDER — CILOSTAZOL 100 MG/1
100 TABLET ORAL TWICE DAILY
Refills: 0 | Status: COMPLETED | COMMUNITY
End: 2024-12-23

## 2024-12-23 RX ORDER — AMLODIPINE BESYLATE 10 MG/1
10 TABLET ORAL
Qty: 90 | Refills: 0 | Status: COMPLETED | COMMUNITY
End: 2024-12-23

## 2024-12-23 RX ORDER — LOSARTAN POTASSIUM AND HYDROCHLOROTHIAZIDE 100; 25 MG/1; MG/1
100-25 TABLET, FILM COATED ORAL
Refills: 0 | Status: ACTIVE | COMMUNITY

## 2024-12-23 RX ORDER — DAPAGLIFLOZIN 5 MG/1
5 TABLET, FILM COATED ORAL DAILY
Refills: 0 | Status: COMPLETED | COMMUNITY
End: 2024-12-23

## 2024-12-23 RX ORDER — LOSARTAN POTASSIUM AND HYDROCHLOROTHIAZIDE 100; 25 MG/1; MG/1
100-25 TABLET, FILM COATED ORAL DAILY
Refills: 0 | Status: COMPLETED | COMMUNITY
End: 2024-12-23

## 2024-12-23 RX ORDER — METFORMIN HYDROCHLORIDE 1000 MG/1
1000 TABLET, COATED ORAL
Qty: 60 | Refills: 0 | Status: COMPLETED | COMMUNITY
End: 2024-12-23

## 2024-12-24 ENCOUNTER — NON-APPOINTMENT (OUTPATIENT)
Age: 64
End: 2024-12-24

## 2024-12-24 RX ORDER — EMPAGLIFLOZIN 25 MG/1
25 TABLET, FILM COATED ORAL
Refills: 0 | Status: ACTIVE | COMMUNITY

## 2024-12-24 RX ORDER — AMLODIPINE BESYLATE 5 MG/1
5 TABLET ORAL
Refills: 0 | Status: COMPLETED | COMMUNITY
End: 2024-12-24

## 2024-12-24 RX ORDER — FENOFIBRATE 54 MG/1
54 TABLET ORAL
Refills: 0 | Status: ACTIVE | COMMUNITY